# Patient Record
Sex: FEMALE | Race: BLACK OR AFRICAN AMERICAN
[De-identification: names, ages, dates, MRNs, and addresses within clinical notes are randomized per-mention and may not be internally consistent; named-entity substitution may affect disease eponyms.]

---

## 2017-05-23 PROBLEM — Z00.00 ENCOUNTER FOR PREVENTIVE HEALTH EXAMINATION: Status: ACTIVE | Noted: 2017-05-23

## 2017-06-13 ENCOUNTER — APPOINTMENT (OUTPATIENT)
Dept: OTOLARYNGOLOGY | Facility: CLINIC | Age: 77
End: 2017-06-13

## 2019-06-13 ENCOUNTER — APPOINTMENT (OUTPATIENT)
Dept: OTOLARYNGOLOGY | Facility: CLINIC | Age: 79
End: 2019-06-13

## 2020-01-23 ENCOUNTER — APPOINTMENT (OUTPATIENT)
Dept: OTOLARYNGOLOGY | Facility: CLINIC | Age: 80
End: 2020-01-23
Payer: MEDICARE

## 2020-01-23 VITALS
SYSTOLIC BLOOD PRESSURE: 207 MMHG | HEART RATE: 96 BPM | BODY MASS INDEX: 32.06 KG/M2 | WEIGHT: 174.2 LBS | HEIGHT: 62 IN | DIASTOLIC BLOOD PRESSURE: 96 MMHG

## 2020-01-23 DIAGNOSIS — Z86.79 PERSONAL HISTORY OF OTHER DISEASES OF THE CIRCULATORY SYSTEM: ICD-10-CM

## 2020-01-23 DIAGNOSIS — E21.0 PRIMARY HYPERPARATHYROIDISM: ICD-10-CM

## 2020-01-23 DIAGNOSIS — Z82.49 FAMILY HISTORY OF ISCHEMIC HEART DISEASE AND OTHER DISEASES OF THE CIRCULATORY SYSTEM: ICD-10-CM

## 2020-01-23 PROCEDURE — 99204 OFFICE O/P NEW MOD 45 MIN: CPT

## 2020-01-23 RX ORDER — NIFEDIPINE 20 MG/1
CAPSULE ORAL
Refills: 0 | Status: ACTIVE | COMMUNITY

## 2020-01-23 RX ORDER — METOPROLOL TARTRATE AND HYDROCHLOROTHIAZIDE 50; 25 MG/1; MG/1
TABLET ORAL
Refills: 0 | Status: ACTIVE | COMMUNITY

## 2020-01-23 NOTE — HISTORY OF PRESENT ILLNESS
[de-identified] : 79F here for initial evaluation.\par \par She was referred for biochemical e/o primary hyperparathyroidism. There is no altered mentation/confusion, no bone pain (she has mild shoulder pain) and no bowel issues/indigestion. \par There is no difficulty eating, breathing, swallowing or talking, no voice change.\par \par Most recent labs:\par -PTH: 109.7\par -Ca: 10/7\par -Vit D: 42\par \par Sestamibi from 2015- equivocal, no adenoma seen\par \par ROs otherwise unremarkable\par

## 2020-01-23 NOTE — ASSESSMENT
[FreeTextEntry1] : 79F here for initial evaluation. She was referred for biochemical e/o primary hyperparathyroidism on routine labwork (.7, Ca 10.7, normal vitamin D). There is no altered mentation/confusion, no bone pain (she has mild shoulder pain) and no bowel issues/indigestion. There is no difficulty eating, breathing, swallowing or talking, no voice change. Nuclear medicine sestamibi scan from 2015 is equivocal, with no adenoma seen. Complete and comprehensive head and neck exam is otherwise unremarkable.\par Based on labs, she has primary hyperparathyroidism, and is asymptomatic. Prior nuclear medicine localization study is negative for adenoma. At this time I will repeat bloodwork and proceed w 4D CT. RTO thereafter. All questions answered.

## 2020-01-23 NOTE — PHYSICAL EXAM
[FreeTextEntry1] : voice strong [de-identified] : flat, soft, no masses/lesions [Midline] : trachea located in midline position [Normal] : no rashes

## 2020-01-23 NOTE — CONSULT LETTER
[Courtesy Letter:] : I had the pleasure of seeing your patient, [unfilled], in my office today. [Dear  ___] : Dear  [unfilled], [Sincerely,] : Sincerely, [FreeTextEntry3] : Aiden Bethea MD\par Department of Otolaryngology - Head and Neck Surgery\par [facility] [Consult Closing:] : Thank you very much for allowing me to participate in the care of this patient.  If you have any questions, please do not hesitate to contact me.

## 2022-11-10 ENCOUNTER — APPOINTMENT (OUTPATIENT)
Dept: OTOLARYNGOLOGY | Facility: CLINIC | Age: 82
End: 2022-11-10

## 2023-01-09 ENCOUNTER — APPOINTMENT (OUTPATIENT)
Dept: OTOLARYNGOLOGY | Facility: CLINIC | Age: 83
End: 2023-01-09